# Patient Record
Sex: FEMALE | Race: ASIAN | NOT HISPANIC OR LATINO | Employment: OTHER | URBAN - METROPOLITAN AREA
[De-identification: names, ages, dates, MRNs, and addresses within clinical notes are randomized per-mention and may not be internally consistent; named-entity substitution may affect disease eponyms.]

---

## 2021-06-15 ENCOUNTER — HOSPITAL ENCOUNTER (OUTPATIENT)
Dept: RADIOLOGY | Facility: MEDICAL CENTER | Age: 56
End: 2021-06-15
Attending: NEUROLOGICAL SURGERY
Payer: COMMERCIAL

## 2021-06-15 DIAGNOSIS — M54.50 CHRONIC LOW BACK PAIN, UNSPECIFIED BACK PAIN LATERALITY, UNSPECIFIED WHETHER SCIATICA PRESENT: ICD-10-CM

## 2021-06-15 DIAGNOSIS — M54.50 LOW BACK PAIN, UNSPECIFIED BACK PAIN LATERALITY, UNSPECIFIED CHRONICITY, UNSPECIFIED WHETHER SCIATICA PRESENT: ICD-10-CM

## 2021-06-15 DIAGNOSIS — G89.29 CHRONIC LOW BACK PAIN, UNSPECIFIED BACK PAIN LATERALITY, UNSPECIFIED WHETHER SCIATICA PRESENT: ICD-10-CM

## 2021-06-15 PROCEDURE — 72110 X-RAY EXAM L-2 SPINE 4/>VWS: CPT

## 2021-06-15 PROCEDURE — 72148 MRI LUMBAR SPINE W/O DYE: CPT

## 2021-06-22 ENCOUNTER — TELEPHONE (OUTPATIENT)
Dept: CARDIOLOGY | Facility: MEDICAL CENTER | Age: 56
End: 2021-06-22

## 2021-06-22 NOTE — TELEPHONE ENCOUNTER
Spoke to patient in regards to records for NP appointment with Dr. Hirsch on 07/01/2021 at 03:40pm. Patient has never been treated by a cardiologist, all recent records in epic including blood work, cardiac testing, and EKG. Confirmed appt date, time and location.    Patient requested for me to get records from [facility] phone # [phone] Fax # [phone]

## 2021-07-01 ENCOUNTER — OFFICE VISIT (OUTPATIENT)
Dept: CARDIOLOGY | Facility: MEDICAL CENTER | Age: 56
End: 2021-07-01
Payer: COMMERCIAL

## 2021-07-01 VITALS
OXYGEN SATURATION: 98 % | DIASTOLIC BLOOD PRESSURE: 90 MMHG | HEART RATE: 92 BPM | HEIGHT: 61 IN | BODY MASS INDEX: 22.66 KG/M2 | WEIGHT: 120 LBS | SYSTOLIC BLOOD PRESSURE: 146 MMHG

## 2021-07-01 DIAGNOSIS — R03.0 ELEVATED BLOOD PRESSURE READING: ICD-10-CM

## 2021-07-01 DIAGNOSIS — R94.31 SHORTENED PR INTERVAL: ICD-10-CM

## 2021-07-01 DIAGNOSIS — Z01.810 PREOPERATIVE CARDIOVASCULAR EXAMINATION: ICD-10-CM

## 2021-07-01 DIAGNOSIS — I44.7 LBBB (LEFT BUNDLE BRANCH BLOCK): ICD-10-CM

## 2021-07-01 PROCEDURE — 99204 OFFICE O/P NEW MOD 45 MIN: CPT | Performed by: INTERNAL MEDICINE

## 2021-07-01 RX ORDER — MAGNESIUM OXIDE 400 MG/1
400 TABLET ORAL DAILY
COMMUNITY

## 2021-07-01 RX ORDER — IBUPROFEN 400 MG/1
400 TABLET ORAL PRN
COMMUNITY

## 2021-07-01 ASSESSMENT — FIBROSIS 4 INDEX: FIB4 SCORE: 1.2

## 2021-07-01 NOTE — PROGRESS NOTES
"CARDIOLOGY NEW PATIENT CONSULTATION    PCP: Cindy Conner M.D.    1. LBBB (left bundle branch block)    2. Shortened VA interval    3. Elevated blood pressure reading    4. Preoperative cardiovascular examination      Shelli Florentino has low risk of perioperative cardiovascular complications before intermediate risk cardiac surgery.  I do not believe surgery needs to be delayed until cardiac testing can be complete though given the abnormal EKG I am suggesting she complete an echocardiogram as well as stress ECG    I recommended to spot check her blood pressure a few times over the next week and contact me if it is averaging greater than 130/80    Follow up: to be determined after testing is complete    Chief Complaint   Patient presents with   • Abnormal EKG     new patient       History: Shelli Florentino is a 55 y.o. female with spine disease presenting for evaluation of abnormal EKG identified in the preoperative setting.  Surgery was canceled after she was found of a left bundle branch block.  On my review I also see that the VA interval is short without a typical delta wave appearance.  She infrequently experiences palpitations-most notable when doing yoga and transitioning from a active position to laying down.  She has no signs of congestive heart failure.      ROS:   All other systems reviewed and negative except as per the HPI    PE:  /90 (BP Location: Left arm, Patient Position: Sitting)   Pulse 92   Ht 1.549 m (5' 1\")   Wt 54.4 kg (120 lb)   SpO2 98%   BMI 22.67 kg/m²   Gen: no acute distress  HEENT: Symmetric face. Anicteric sclerae. Moist mucus membranes  NECK: No JVD. No lymphadenopathy  CARDIAC: regular, Normal S1. Paradoxical splitting of the second heart sound (typical of LBBB), without murmur  VASCULATURE: carotids are normal bilaterally without bruit  RESP: Clear to auscultation bilaterally  ABD: Soft, non-tender, non-distended  EXT: No edema, no clubbing or cyanosis  SKIN: Warm and " dry  NEURO: No gross deficits  PSYCH: Appropriate affect, participates in conversation    The 10-year ASCVD risk score (Bradleyvillespenser ALDANA Jr., et al., 2013) is: 2.6%    Past Medical History:   Diagnosis Date   • Head ache      Past Surgical History:   Procedure Laterality Date   • CHOLECYSTECTOMY       Allergies   Allergen Reactions   • Keflex      Outpatient Encounter Medications as of 7/1/2021   Medication Sig Dispense Refill   • ibuprofen (MOTRIN) 400 MG Tab Take 400 mg by mouth as needed.     • Multiple Vitamin (MULTI-VITAMIN DAILY PO) Take  by mouth.     • Digestive Enzymes (BETAINE HCL PO) Take  by mouth.     • magnesium oxide (MAG-OX) 400 MG Tab tablet Take 400 mg by mouth every day.     • Omega-3 Fatty Acids (FISH OIL BURP-LESS PO) Take 2,400 mg by mouth.     • Plant Sterols and Stanols (CHOLESTOFF PLUS PO) Take 900 mg by mouth.     • Evening Primrose Oil 1000 MG Cap Take  by mouth.     • estradiol (ESTRACE) 1 MG Tab Take 1 mg by mouth every day.     • medroxyPROGESTERone (PROVERA) 2.5 MG Tab Take 2.5 mg by mouth every day.       No facility-administered encounter medications on file as of 7/1/2021.     Social History     Socioeconomic History   • Marital status:      Spouse name: Not on file   • Number of children: Not on file   • Years of education: Not on file   • Highest education level: Not on file   Occupational History   • Not on file   Tobacco Use   • Smoking status: Never Smoker   • Smokeless tobacco: Never Used   Substance and Sexual Activity   • Alcohol use: Yes     Alcohol/week: 0.6 oz     Types: 1 Glasses of wine per week   • Drug use: No   • Sexual activity: Not on file   Other Topics Concern   • Not on file   Social History Narrative   • Not on file     Social Determinants of Health     Financial Resource Strain:    • Difficulty of Paying Living Expenses:    Food Insecurity:    • Worried About Running Out of Food in the Last Year:    • Ran Out of Food in the Last Year:    Transportation Needs:     • Lack of Transportation (Medical):    • Lack of Transportation (Non-Medical):    Physical Activity:    • Days of Exercise per Week:    • Minutes of Exercise per Session:    Stress:    • Feeling of Stress :    Social Connections:    • Frequency of Communication with Friends and Family:    • Frequency of Social Gatherings with Friends and Family:    • Attends Buddhist Services:    • Active Member of Clubs or Organizations:    • Attends Club or Organization Meetings:    • Marital Status:    Intimate Partner Violence:    • Fear of Current or Ex-Partner:    • Emotionally Abused:    • Physically Abused:    • Sexually Abused:      Family History   Problem Relation Age of Onset   • Heart Disease Mother    • Hypertension Brother    • Diabetes Brother          Studies  Lab Results   Component Value Date/Time    CHOLSTRLTOT 201 (H) 05/25/2021 09:55 AM     (H) 05/25/2021 09:55 AM    HDL 68.0 (H) 05/25/2021 09:55 AM    TRIGLYCERIDE 62 05/25/2021 09:55 AM       Lab Results   Component Value Date/Time    SODIUM 140 05/25/2021 09:55 AM    POTASSIUM 3.9 05/25/2021 09:55 AM    CHLORIDE 105 05/25/2021 09:55 AM    CO2 28 05/25/2021 09:55 AM    GLUCOSE 103 (H) 05/25/2021 09:55 AM    BUN 11 05/25/2021 09:55 AM    CREATININE 0.7 05/25/2021 09:55 AM     Lab Results   Component Value Date/Time    ALKPHOSPHAT 42 (L) 05/25/2021 09:55 AM    ASTSGOT 21 05/25/2021 09:55 AM    ALTSGPT 23 05/25/2021 09:55 AM    TBILIRUBIN 0.3 05/25/2021 09:55 AM        For this encounter I reviewed the following medical records BMP, Lipid profile and CBC     For this encounter I directly reviewed ECG tracings.  Sinus rhythm, short SC interval, left bundle branch block

## 2021-07-07 ENCOUNTER — HOSPITAL ENCOUNTER (OUTPATIENT)
Dept: CARDIOLOGY | Facility: MEDICAL CENTER | Age: 56
End: 2021-07-07
Attending: INTERNAL MEDICINE
Payer: COMMERCIAL

## 2021-07-07 DIAGNOSIS — I44.7 LBBB (LEFT BUNDLE BRANCH BLOCK): ICD-10-CM

## 2021-07-07 LAB
LV EJECT FRACT  99904: 65
LV EJECT FRACT MOD 2C 99903: 75.6
LV EJECT FRACT MOD 4C 99902: 67.96
LV EJECT FRACT MOD BP 99901: 73.1

## 2021-07-07 PROCEDURE — 93306 TTE W/DOPPLER COMPLETE: CPT

## 2021-07-07 PROCEDURE — 93306 TTE W/DOPPLER COMPLETE: CPT | Mod: 26 | Performed by: INTERNAL MEDICINE

## 2021-08-17 ENCOUNTER — PATIENT MESSAGE (OUTPATIENT)
Dept: CARDIOLOGY | Facility: MEDICAL CENTER | Age: 56
End: 2021-08-17

## 2021-08-17 ENCOUNTER — TELEPHONE (OUTPATIENT)
Dept: CARDIOLOGY | Facility: MEDICAL CENTER | Age: 56
End: 2021-08-17

## 2021-08-17 VITALS — DIASTOLIC BLOOD PRESSURE: 68 MMHG | SYSTOLIC BLOOD PRESSURE: 108 MMHG

## 2021-08-17 NOTE — TELEPHONE ENCOUNTER
Nan from Dr Ureña called to advise they they faxed the order to [phone]. I am showing it in right fax currently 8/17/21 11:23 1Pg. The Pt procedure is 8/18. They need it signed and faxed back.     Thank you,  Cindy PANDYA

## 2021-08-17 NOTE — TELEPHONE ENCOUNTER
To Dr. Hirsch to clarify office visit note: Ok to proceed with surgery without stress test first?

## 2021-08-17 NOTE — TELEPHONE ENCOUNTER
LYNNETTE Kwon called from Pre-admit at [facility] stating the pt was just put back on the schedule for tomorrow 8/18 to have procedure Posterior Right L4-5 Hemilaminectomy and they are needing a clearance letter for pt. Doyle states that she is going to call Dr. Ureña's office and have them send over an Urgent Clearance Request. Pt was also able to complete the echo but was unable to do the Stress Test. Please call Doyle back to further discuss at [phone].    Doyle states per Anesthesiologist, that the Clearance would need to state, that the pt is okay to proceed with out completing the Stress Test.     You can also call:  Dr. Ureña Office - [place] [phone] Ext: [phone]    Thank you.

## 2021-08-17 NOTE — TELEPHONE ENCOUNTER
Kimani Hirsch M.D.  You 4 minutes ago (1:25 PM)     Yes can proceed with surgery before cardiac testing is complete. How is the BP Running?   Thanks   BE      ------------------------------------------------------------------  Faxed note and progress note to [facility] [phone]

## 2022-11-28 NOTE — TELEPHONE ENCOUNTER
Sent [employer] message   Transportation Risk (There is always a risk of traffic delays resulting in deterioration of condition.)

## 2024-09-03 PROCEDURE — RXMED WILLOW AMBULATORY MEDICATION CHARGE: Performed by: INTERNAL MEDICINE

## 2024-09-05 ENCOUNTER — PHARMACY VISIT (OUTPATIENT)
Dept: PHARMACY | Facility: MEDICAL CENTER | Age: 59
End: 2024-09-05
Payer: COMMERCIAL

## 2024-09-20 ENCOUNTER — DOCUMENTATION (OUTPATIENT)
Dept: PHARMACY | Facility: MEDICAL CENTER | Age: 59
End: 2024-09-20
Payer: COMMERCIAL

## 2024-09-20 NOTE — PROGRESS NOTES
TRF Onboarding  Diagnosis: Psoriatic arthritis (L40.50); Plaque psoriasis (L40.0)      Drug & Non-Drug Allergies:    - EMR Reviewed. No concerning drug or non-drug allergies.                                                                                          Drug Therapy (name/formulation/dose/route of admin/frequency): Otezla 30mg tablet #1 tablet by mouth twice daily   EMR Reviewed   - Dose Appropriateness (Y/N): Y   - Renal or Hepatic Adjustments (Y/N): N   - Any comorbidities, PMH, precautions, or contraindications that pose a medication safety concern? (Y/N): N   - Any relevant lab work needed that affects the initial start date? (Y/N): N/A as existing to therapy        - List Past Treatments: Otezla, Hydrocortisone cream, NSAIDs    - List DDI’s: no clinically significant DDIs identified    - Patient’s ability to self-administer medication     - No issues identified per EMR   List Goals of Therapy:      - Achieve and maintain remission    - Prevent and reduce flares and inflammation    - Alleviate pain    - Decrease signs and symptoms of disease   - Maintain function of Activities of Daily Living (ADLs)     gap list patient first filled on 9/5/24.        Initial Assessment **TRF**  Dx: Psoriatic arthritis (L40.50); Plaque psoriasis (L40.0)     Tx prescribed: Otezla 30mg tablet #1 tablet by mouth twice daily   - Administration: taking Otezla #1 tablet by mouth twice daily at 9am/9pm; taking on empty stomach; swallowing tablets whole    - Proper Handling/Disposal: Patient handles medication herself. Reminded to wash hands well with soap + water after admin. Do not throw into trash/drains, use med takeback program.   - Storage/Excursion: Keep at room temp, protect from heat, light and humidity.    - Duration of therapy: indefinite, or until txt failure/intolerable SE   Adherence & Potential Barriers: no adherence predictor barriers identified; no missed doses; using medication pill box    - Missed dose  mgmt: If you miss a dose, take it as soon as you can. Skip your dose if it's almost time for the next dose. Do not take double or extra doses.    - Drug Interruptions/Hold (Infection, Abx Use): hold for fever, antibiotic use, and surgery - contact provider if unsure    List Common, Serious SE & Mitigation Reviewed: patient declined review  List Precautions Reviewed: patient declined review  List Current SE Reviewed (if applicable): 3 days of loose bowel movements during use of starter pack   - Mitigation/mgmt: symptoms occurred during ~day 6 - 9 of starter pack and have resolved independently  S/Sx: swelling in hands, feet, and knees in the morning; no active psoriasis symptoms; nail changes   # of flares (last 4 weeks): none   Pain scale (avg last week): 1.5/10  Clinically Relevant, Abnormal Labs from 8/9/24 unless otherwise noted:    - CBC from 5/15/24: RDW 14.5 (H)   - Chem7 from 5/15/24: SCr 0.55 (L), Glucose 101 (H)   - LFTs from 5/15/24: Globulin 2.2 (L)   - ESR from 5/15/24: 6   - CRP from 5/15/24: 0.04   - Vitamin D from 8/16/23: 45.6   - TB Status (if applicable): negative   - HBsAg: nonreactive    - HBcAb: nonreactive    - HBsAb: > 1000    - HepC from 5/15/24: negative    - HIV from 5/15/24: negative    - BP/HR: WNL (8/20/24)    - BSA affected: none per EMR review    - PASI: none per EMR review      Wellness/Lifestyle Counseling: reviewed infection reduction measures with proper hand washings, avoiding those who are sick/ill, maintaining appropriate sleep/diet/hydration intake    - Immunizations: recommended [id number] yearly flu + COVID booster; patient reported completing Shingrix series   Med Rec/Updated drug list    - EMR inaccurate, medication changes reviewed with patient:     ? (-) Estradiol, Provera, Primrose  DI Check: no clinically significant DDIs identified  Common DI & Dietary Avoidance: none      Goals of Therapy:    - Achieve and maintain remission    - Prevent and reduce flares and  inflammation    - Alleviate pain    - Decrease signs and symptoms of disease   - Maintain function of Activities of Daily Living (ADLs)  Patient has agreed/understands to goals of therapy during education/counseling    Additional: I had the pleasure of speaking with Shelli for initial transfer assessment on [place]la. She completed the initial starter pack and experienced some looser bowel movements around days 6-9. Symtpoms have resolved and have not returned. She stopped her OTC NSAID where her joint pain and swelling have remained comparable to baseline with sole use of NSAID. She is aware of at least 3-4 months and even up to 6 months of medication use as a fair trial. I reviewed the refill team should be making outreach next week ~9/26. She expressed appreciation for the call and had no further questions.

## 2024-09-26 PROCEDURE — RXMED WILLOW AMBULATORY MEDICATION CHARGE: Performed by: INTERNAL MEDICINE

## 2024-10-01 ENCOUNTER — PHARMACY VISIT (OUTPATIENT)
Dept: PHARMACY | Facility: MEDICAL CENTER | Age: 59
End: 2024-10-01
Payer: COMMERCIAL

## 2024-10-23 PROCEDURE — RXMED WILLOW AMBULATORY MEDICATION CHARGE: Performed by: INTERNAL MEDICINE

## 2024-10-29 ENCOUNTER — PHARMACY VISIT (OUTPATIENT)
Dept: PHARMACY | Facility: MEDICAL CENTER | Age: 59
End: 2024-10-29
Payer: COMMERCIAL

## 2024-11-15 PROCEDURE — RXMED WILLOW AMBULATORY MEDICATION CHARGE: Performed by: INTERNAL MEDICINE

## 2024-11-20 ENCOUNTER — PHARMACY VISIT (OUTPATIENT)
Dept: PHARMACY | Facility: MEDICAL CENTER | Age: 59
End: 2024-11-20
Payer: COMMERCIAL

## 2024-12-09 PROCEDURE — RXMED WILLOW AMBULATORY MEDICATION CHARGE: Performed by: INTERNAL MEDICINE

## 2024-12-16 ENCOUNTER — PHARMACY VISIT (OUTPATIENT)
Dept: PHARMACY | Facility: MEDICAL CENTER | Age: 59
End: 2024-12-16
Payer: COMMERCIAL

## 2025-01-17 PROCEDURE — RXMED WILLOW AMBULATORY MEDICATION CHARGE: Performed by: INTERNAL MEDICINE

## 2025-01-20 ENCOUNTER — PHARMACY VISIT (OUTPATIENT)
Dept: PHARMACY | Facility: MEDICAL CENTER | Age: 60
End: 2025-01-20
Payer: COMMERCIAL

## 2025-02-26 PROCEDURE — RXMED WILLOW AMBULATORY MEDICATION CHARGE: Performed by: INTERNAL MEDICINE

## 2025-02-27 ENCOUNTER — PHARMACY VISIT (OUTPATIENT)
Dept: PHARMACY | Facility: MEDICAL CENTER | Age: 60
End: 2025-02-27
Payer: COMMERCIAL

## 2025-03-21 PROCEDURE — RXMED WILLOW AMBULATORY MEDICATION CHARGE: Performed by: INTERNAL MEDICINE

## 2025-03-27 ENCOUNTER — PHARMACY VISIT (OUTPATIENT)
Dept: PHARMACY | Facility: MEDICAL CENTER | Age: 60
End: 2025-03-27
Payer: COMMERCIAL

## 2025-04-17 PROCEDURE — RXMED WILLOW AMBULATORY MEDICATION CHARGE: Performed by: INTERNAL MEDICINE

## 2025-04-23 ENCOUNTER — PHARMACY VISIT (OUTPATIENT)
Dept: PHARMACY | Facility: MEDICAL CENTER | Age: 60
End: 2025-04-23
Payer: COMMERCIAL

## 2025-05-15 PROCEDURE — RXMED WILLOW AMBULATORY MEDICATION CHARGE: Performed by: INTERNAL MEDICINE

## 2025-05-19 ENCOUNTER — PHARMACY VISIT (OUTPATIENT)
Dept: PHARMACY | Facility: MEDICAL CENTER | Age: 60
End: 2025-05-19
Payer: COMMERCIAL

## 2025-06-12 PROCEDURE — RXMED WILLOW AMBULATORY MEDICATION CHARGE: Performed by: INTERNAL MEDICINE

## 2025-06-16 ENCOUNTER — PHARMACY VISIT (OUTPATIENT)
Dept: PHARMACY | Facility: MEDICAL CENTER | Age: 60
End: 2025-06-16
Payer: COMMERCIAL

## 2025-07-08 PROCEDURE — RXMED WILLOW AMBULATORY MEDICATION CHARGE: Performed by: INTERNAL MEDICINE

## 2025-07-11 ENCOUNTER — PHARMACY VISIT (OUTPATIENT)
Dept: PHARMACY | Facility: MEDICAL CENTER | Age: 60
End: 2025-07-11
Payer: COMMERCIAL

## 2025-08-08 ENCOUNTER — SPECIALTY PHARMACY (OUTPATIENT)
Dept: PHARMACY | Facility: MEDICAL CENTER | Age: 60
End: 2025-08-08
Payer: COMMERCIAL

## 2025-08-08 PROCEDURE — RXMED WILLOW AMBULATORY MEDICATION CHARGE: Performed by: INTERNAL MEDICINE

## 2025-08-14 ENCOUNTER — PHARMACY VISIT (OUTPATIENT)
Dept: PHARMACY | Facility: MEDICAL CENTER | Age: 60
End: 2025-08-14
Payer: COMMERCIAL